# Patient Record
Sex: MALE | Race: WHITE
[De-identification: names, ages, dates, MRNs, and addresses within clinical notes are randomized per-mention and may not be internally consistent; named-entity substitution may affect disease eponyms.]

---

## 2018-02-22 ENCOUNTER — HOSPITAL ENCOUNTER (EMERGENCY)
Dept: HOSPITAL 76 - ED | Age: 68
Discharge: ANOTHER HOSPITAL | End: 2018-02-22
Payer: MEDICARE

## 2018-02-22 ENCOUNTER — HOSPITAL ENCOUNTER (OUTPATIENT)
Dept: HOSPITAL 76 - EMS | Age: 68
Discharge: ANOTHER HOSPITAL | End: 2018-02-22
Attending: SURGERY
Payer: MEDICARE

## 2018-02-22 VITALS — DIASTOLIC BLOOD PRESSURE: 56 MMHG | SYSTOLIC BLOOD PRESSURE: 90 MMHG

## 2018-02-22 DIAGNOSIS — I48.91: ICD-10-CM

## 2018-02-22 DIAGNOSIS — I71.01: Primary | ICD-10-CM

## 2018-02-22 DIAGNOSIS — R41.82: Primary | ICD-10-CM

## 2018-02-22 DIAGNOSIS — R53.1: ICD-10-CM

## 2018-02-22 DIAGNOSIS — I10: ICD-10-CM
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
## 2018-02-22 LAB
ANION GAP SERPL CALCULATED.4IONS-SCNC: 16 MMOL/L (ref 6–13)
BASOPHILS NFR BLD AUTO: 0.1 10^3/UL (ref 0–0.1)
BASOPHILS NFR BLD AUTO: 0.6 %
BUN SERPL-MCNC: 20 MG/DL (ref 6–20)
CALCIUM UR-MCNC: 8.9 MG/DL (ref 8.5–10.3)
CHLORIDE SERPL-SCNC: 101 MMOL/L (ref 101–111)
CO2 SERPL-SCNC: 21 MMOL/L (ref 21–32)
CREAT SERPLBLD-SCNC: 1.4 MG/DL (ref 0.6–1.2)
EOSINOPHIL # BLD AUTO: 0.2 10^3/UL (ref 0–0.7)
EOSINOPHIL NFR BLD AUTO: 2.2 %
ERYTHROCYTE [DISTWIDTH] IN BLOOD BY AUTOMATED COUNT: 13.9 % (ref 12–15)
GFRSERPLBLD MDRD-ARVRAT: 50 ML/MIN/{1.73_M2} (ref 89–?)
GLUCOSE SERPL-MCNC: 112 MG/DL (ref 70–100)
HGB UR QL STRIP: 13.9 G/DL (ref 14–18)
INR PPP: 1.4 (ref 0.8–1.2)
LYMPHOCYTES # SPEC AUTO: 3.7 10^3/UL (ref 1.5–3.5)
LYMPHOCYTES NFR BLD AUTO: 36.2 %
MCH RBC QN AUTO: 31.5 PG (ref 27–31)
MCHC RBC AUTO-ENTMCNC: 34.4 G/DL (ref 32–36)
MCV RBC AUTO: 91.8 FL (ref 80–94)
MONOCYTES # BLD AUTO: 0.8 10^3/UL (ref 0–1)
MONOCYTES NFR BLD AUTO: 8.2 %
NEUTROPHILS # BLD AUTO: 5.4 10^3/UL (ref 1.5–6.6)
NEUTROPHILS # SNV AUTO: 10.3 X10^3/UL (ref 4.8–10.8)
NEUTROPHILS NFR BLD AUTO: 52.8 %
PDW BLD AUTO: 9.7 FL (ref 7.4–11.4)
PLATELET # BLD: 156 10^3/UL (ref 130–450)
PROTHROM ACT/NOR PPP: 15.6 SECS (ref 9.9–12.6)
RBC MAR: 4.4 10^6/UL (ref 4.7–6.1)
SODIUM SERPLBLD-SCNC: 138 MMOL/L (ref 135–145)

## 2018-02-22 PROCEDURE — 84484 ASSAY OF TROPONIN QUANT: CPT

## 2018-02-22 PROCEDURE — 71275 CT ANGIOGRAPHY CHEST: CPT

## 2018-02-22 PROCEDURE — 36415 COLL VENOUS BLD VENIPUNCTURE: CPT

## 2018-02-22 PROCEDURE — 99285 EMERGENCY DEPT VISIT HI MDM: CPT

## 2018-02-22 PROCEDURE — 70498 CT ANGIOGRAPHY NECK: CPT

## 2018-02-22 PROCEDURE — 85025 COMPLETE CBC W/AUTO DIFF WBC: CPT

## 2018-02-22 PROCEDURE — 85610 PROTHROMBIN TIME: CPT

## 2018-02-22 PROCEDURE — 96360 HYDRATION IV INFUSION INIT: CPT

## 2018-02-22 PROCEDURE — 80048 BASIC METABOLIC PNL TOTAL CA: CPT

## 2018-02-22 PROCEDURE — 85730 THROMBOPLASTIN TIME PARTIAL: CPT

## 2018-02-22 PROCEDURE — 70496 CT ANGIOGRAPHY HEAD: CPT

## 2018-02-22 PROCEDURE — 93005 ELECTROCARDIOGRAM TRACING: CPT

## 2018-02-22 PROCEDURE — 74174 CTA ABD&PLVS W/CONTRAST: CPT

## 2018-02-22 PROCEDURE — 99284 EMERGENCY DEPT VISIT MOD MDM: CPT

## 2018-02-22 RX ADMIN — IOPAMIDOL ONE ML: 612 INJECTION, SOLUTION INTRAVENOUS at 14:59

## 2018-02-22 RX ADMIN — IOPAMIDOL ONE ML: 612 INJECTION, SOLUTION INTRAVENOUS at 15:03

## 2018-02-22 NOTE — ED PHYSICIAN DOCUMENTATION
History of Present Illness





- Stated complaint


Stated Complaint: Poss stroke





- Additonal information


Additional information: 





hx from pt wife and EMS


67 male


pmhx HTN maybe a fib


was fine this AM at 10AM


went to post office


returned in the truck and parked s difficulty at 1030


at approx 11 AM wife went looking for him and found him on his hand and knees 

in the snow with L sided weakness


he states he did not hit his head, 


initially stated he had a HA then repeatedly stated no HA


no neck pain


no chest pain


no abd pain








Review of Systems


Constitutional: denies: Fever


Cardiac: denies: Chest pain / pressure


Respiratory: denies: Dyspnea


GI: denies: Abdominal Pain, Vomiting


Musculoskeletal: denies: Neck pain


Neurologic: reports: Focal weakness, Numbness, Difficulty speaking, Headache.  

denies: Head injury


Endocrine: denies: Easy bruising / bleeding


Immunocompromised: denies: Immunocompromised





PD PAST MEDICAL HISTORY





- Past Surgical History


Past Surgical History: Yes


General: Appendectomy


HEENT: Tonsil/Adenoidectomy





- Allergies


Allergies/Adverse Reactions: 


 Allergies











Allergy/AdvReac Type Severity Reaction Status Date / Time


 


sulfacetamide sodium * Allergy Severe Unknown Verified 06/28/14 13:40





[From Sulfamide]     














- Social History


Does the pt smoke?: No


Smoking Status: Never smoker


Does the pt drink ETOH?: No


Does the pt have substance abuse?: No





- Immunizations


Immunizations are current?: No


Immunizations: TDAP >10years/unknown





- POLST


Patient has POLST: No





PD ED PE NORMAL





- Vitals


Vital signs reviewed: Yes





- General


General: Alert and oriented X 3





- HEENT


HEENT: Atraumatic, PERRL.  No: EOMI (gaze does not cross L of midline)





- Neck


Neck: No bony TTP





- Cardiac


Cardiac: RRR





- Respiratory


Respiratory: No respiratory distress, Clear bilaterally





- Abdomen


Abdomen: Soft, Non tender





- Derm


Derm: Other (diaphoretic)





- Neuro


Neuro: Alert and oriented X 3.  No: CNs 2-12 intact, No motor deficit, No 

sensory deficit, Normal speech


Eye Opening: Spontaneous


Motor: Obeys Commands


Verbal: Oriented


GCS Score: 15





Results





- Vitals


Vitals: 


 Vital Signs - 24 hr











  02/22/18 02/22/18 02/22/18





  12:01 13:29 13:55


 


Temperature 36.2 C L  


 


Heart Rate 88 44 L 56 L


 


Respiratory 18 16 16





Rate   


 


Blood Pressure 106/71 90/46 L 90/56 L


 


O2 Saturation 98 92 93








 Oxygen











O2 Source                      Room air

















- EKG (time done)


  ** 1237


Rate: Rate (enter#) (78)


Rhythm: Atrial fibrillation, Other (freq PVCs)


Axis: LAD


Intervals: No: Normal FL


Ischemia: Non specific changes





- Labs


Labs: 


 Laboratory Tests











  02/22/18 02/22/18 02/22/18





  12:05 12:05 12:05


 


WBC  10.3  


 


RBC  4.40 L  


 


Hgb  13.9 L  


 


Hct  40.4 L  


 


MCV  91.8  


 


MCH  31.5 H  


 


MCHC  34.4  


 


RDW  13.9  


 


Plt Count  156  


 


MPV  9.7  


 


Neut #  5.4  


 


Lymph #  3.7 H  


 


Mono #  0.8  


 


Eos #  0.2  


 


Baso #  0.1  


 


Absolute Nucleated RBC  0.00  


 


Nucleated RBC %  0.0  


 


PT   15.6 H 


 


INR   1.4 H 


 


APTT   27.3 


 


Sodium    138


 


Potassium    3.1 L


 


Chloride    101


 


Carbon Dioxide    21


 


Anion Gap    16.0 H


 


BUN    20


 


Creatinine    1.4 H


 


Estimated GFR (MDRD)    50 L


 


Glucose    112 H


 


Calcium    8.9


 


Troponin I   














  02/22/18





  12:05


 


WBC 


 


RBC 


 


Hgb 


 


Hct 


 


MCV 


 


MCH 


 


MCHC 


 


RDW 


 


Plt Count 


 


MPV 


 


Neut # 


 


Lymph # 


 


Mono # 


 


Eos # 


 


Baso # 


 


Absolute Nucleated RBC 


 


Nucleated RBC % 


 


PT 


 


INR 


 


APTT 


 


Sodium 


 


Potassium 


 


Chloride 


 


Carbon Dioxide 


 


Anion Gap 


 


BUN 


 


Creatinine 


 


Estimated GFR (MDRD) 


 


Glucose 


 


Calcium 


 


Troponin I  < 0.04














- Rads (name of study)


  ** CTH


Radiology: Other (per my read no ICH awaiting rad read - per rad no bleed, 

chronic small vessel dz, no acute infarct)





  ** CTA head


Radiology: See rad report (awaiting rad - per rad no occulusion R MCA, internal 

cervical arteries large thrombus filling defect R internal carotid, good 

collateral perfusion via ant communicating circ)





  ** CTA neck


Radiology: Other (call fro radiology Dr Villa = type A aortic dissection back 

to aortic valve but no pericardial effusin, active extravasation into false 

lumen)





  ** CTA chest abd pelvis


Radiology: Other (per my read type A dissection extending past kidney with 

decreased renal perfusion L kidney, per rad read aciute type A dissection, 

defect wall ascending aorta with blush contrast into false lumen is likely 

origin, no pericardial effusion but extends to aortic root and extends to L 

common iliac artery, kidney symm opacified, main arteries off true lumen)





PD MEDICAL DECISION MAKING





- ED course


ED course: 





acute L sided weakness numbness gaze palsy and dysarthria


FSBS 99 PTA


last seen nl at 10 AM but able to drive and park at 1030 so likely still nl 

then as well


initial concen was acute stroke


code stroke called - pt to radiology for CT head CTA head and neck


returned and neuro from [place] on tele stroke to eval pt and discuss possible 

TPA etc


but CTA neck showed dissection or aortic arch - type A - rad tech noted and 

called from CT and then we scanned the chest abd pelvis as well 


shows type A dissection from aortic root to iliacs, active leaking into false 

lumen form defect in ascending aorta


cancelled tele stroke


[place] [facility] [place] - transfer center confirmed 

bed and dispatched [facility] during the initial call - still a while for [facility] to be 

able to reach [place]


pt had gradually decreasing BPs despite IVF


pt and wife updated re critical nature of this process and that even at the , 

even with possible surgery etc he might die from this dissection


wife remained at bedside throughout pt stay and was allowed to fly with her 

 to 





- TPA CVA checklist


Inclusion crititeria: positive: Sig neuro deficit


Absolute contraindications: negative: SBP>185 DBP>110 s/p tx, INR >1.7, Known 

bleeding disorder, Surgery/trauma < 15 days, Seizure at onset, Internal bleed < 

22 days, Brain/spine surg < 3 m, Head trauma < 3 m, CVA < 3 months, Any hx ICH, 

Any hx brain aneurysm, Any hx brain AVM, Any hx brain tumor, Suspect SAH


Relative contraindications: negative: Too severe (NIHSS>22), Too mild, Rapid 

improvement, Glusose <50 >400, Life expectancy < 1 yr, Severe comorbid illness, 

Bacterial endocarditis, Severe hepatic dz, Severe renal dz, Hemorrhagic eye 

condition, Septic thrombophlebitis, Infected AV shunt, On coumadin, Pregnancy, 

Advanced age, Left heart thrombus


Absolute contraindications if 3-4.5 hr: negative: Coumadin (any INR), Age > 80, 

Combo prior CVA & DM





Departure





- Departure


Disposition: 02 Transfer Acute Care Hosp


Clinical Impression: 


Aortic dissection


Qualifiers:


 Aortic location: thoracic aorta Qualified Code(s): I71.01 - Dissection of 

thoracic aorta





Condition: Serious


Follow-Up: 


Lincoln Shin PA [Primary Care Provider] - 


Discharge Date/Time: 02/22/18 14:02





NIHSS





- Time


Time: 11:55





- Level of Consciousness


Level of consciousness: (0) Alert, Keenly responsive


LOC Questions: (0) Answers both Q's correct


LOC Commands: (0) Performs both correctly





- Gaze


Best Gaze: (1) Partial gaze palsy





- Visual


Visual: (0) No loss





- Facial Palsy


Facial Palsy: (1) Minor paralysis





- Motor Arms (both separate)


Motor Arm (right): (0) No drift


Motor Arm (left): (4) No movement





- Motor Legs (both separate)


Motor Leg (right): (0) No drift


Motor Leg (left): (4) No movement





- Limb Ataxia


Limb Ataxia: (0) Absent





- Sensory


Sensory: (1) Mild-to-moderate loss





- Best Language


Best Language: (0) No aphasia





- Dysarthria


Dysarthria: (1) Mild-to-moderate dysarthria





- Extinction and Inattention (formally neg


Extinction and inattention: (0) No abnormality





- Total Score/Results


Total Score/Result: 12

## 2018-02-22 NOTE — CT REPORT
EXAM:

CT ANGIOGRAM NECK

 

EXAM DATE: 2/22/2018 12:37 PM.

 

CLINICAL HISTORY: Acute stroke symptoms, left side weakness.

 

COMPARISON: None.

 

TECHNIQUE: Routine axial helical imaging was performed from the skull base through the aortic arch. R
econstructions: Routine multiplanar 3D MIP reconstructions. IV Contrast: 80 mL Isovue 300. Evaluation
 of arterial stenosis is based on a NASCET method of measurement.

 

In accordance with CT protocol optimization, one or more of the following dose reduction techniques w
ere utilized for this exam: automated exposure control, adjustment of mA and/or KV based on patient s
ize, or use of iterative reconstructive technique.

 

FINDINGS: 

Type A thoracic aortic dissection, please see chest CT angiogram for additional detail.

 

Moderate to severe origin stenosis of the right common carotid artery. Aortic dissection likely exten
ds distally into the right cervical carotid artery. Diffuse abnormal wall thickening or hypo-enhancem
ent of a portion of the distorted lumen creates the appearance of moderately prominent right common c
arotid artery stenosis distal to its origin. In the mid to distal right common carotid artery and ext
ending into much of the right cervical internal carotid artery is a long tubular intraluminal thrombu
s filling defect creating marked luminal stenosis. This is discontinuous in the high cervical ICA but
 appears to continue also in the horizontal petrous segment of the right ICA creating significant lum
inal contrast effacement. The right cavernous ICA appears to be maintained.

 

No evidence for acute abnormality, significant stenosis, or focal filling defect of the left cervical
 carotid artery. This vessel appears to arise from and communicate with the more strongly opacified p
resumably true lumen of the top of the aortic arch. No flow-limiting stenosis of the left subclavian 
artery origin. Stenosis of the innominate artery is present, the dissection likely extends into this 
artery branch which does appear to communicate with the true lumen at the top of the aortic arch. No 
flow limiting stenosis of the proximal right subclavian artery.

 

No evidence for acute abnormality or focal flow limiting stenosis of the right cervical vertebral art
juan. The lumen of the origin of the left cervical vertebral artery is obscured by densely calcified a
therosclerotic plaque and associated stenosis cannot be accurately evaluated. More distally, the left
 cervical vertebral artery shows no evidence for acute abnormality or flow-limiting stenosis.

 

Note that detailed arterial evaluation of the neck is limited, mixed venous and arterial contrast opa
cification with suboptimal concentration/density of arterial luminal opacification.

 

IMPRESSION: 

1. Probable Type A dissection of the thoracic aorta with large circumferential and expansile intramur
al hematoma.

2. Dissection appears to extend most notably into the right cervical carotid artery, but also appears
 to involve the proximal left subclavian artery and innominate artery.

3. Significant appearing stenosis of the right cervical carotid artery at multiple levels, this vesse
l contains a large long tubular intraluminal thrombus filling defect which places the patient at risk
 for intracranial embolus and infarct.

4. No evidence for focal flow limiting stenosis of the left cervical carotid artery.

5. Patent cervical vertebral arteries. The origin of the left cervical vertebral artery is obscured b
y a dense calcified plaque and potential stenosis in this location cannot be fully evaluated.

 

[facility]

Referring Provider Line: [phone]

 

SITE ID: [id number]

## 2018-02-22 NOTE — CT REPORT
EXAM:

CT ANGIOGRAM HEAD.

CT SCAN OF THE HEAD WITHOUT AND WITH CONTRAST.

 

EXAM DATE: 2/22/2018 12:37 PM

 

CLINICAL HISTORY: Acute left-sided weakness.

 

COMPARISON: None.

 

TECHNIQUE:

- CT Scan Head: Using a multidetector scanner, axial images were acquired from the foramen magnum to 
the skull vertex prior to and following contrast administration. 

- CT Angiogram: Using a multidetector scanner, high-resolution axial images were acquired from the sk
ull base through vertex following rapid infusion of intravenous contrast. Reformats: Multiplanar MIP 
reformats were reconstructed. Nascet criteria used for stenosis measurement.

 

IV Contrast: Without and with 140 mL Isovue-300.

 

In accordance with CT protocol optimization, one or more of the following dose reduction techniques w
ere utilized for this exam: automated exposure control, adjustment of mA and/or KV based on patient s
ize, or use of iterative reconstructive technique.

 

FINDINGS:  

Brain CT without contrast:

No CT evidence for acute ischemic infarct or acute intracranial hemorrhage.

 

Diffuse atrophy and moderately prominent amorphous white matter hypoattenuation, likely changes of ag
ing and chronic microangiopathy.

 

Intact calvarium. No acute sinus or mastoid disease.

 

Brain CT with contrast:

No intracranial enhancing tumor-like mass.

 

Head CT angiogram:

Cervical carotid artery abnormality is reported separately.

 

No proximal intracranial large artery occlusion. Both distal internal carotid arteries are patent at 
and distal to the cavernous segments. Mild vertebral artery luminal stenosis bilaterally where there 
is focal atherosclerotic calcification just above the foramen magnum.

 

No evidence for [place] aneurysm. Contrast opacification of the major dural venous sinuses i
s present as expected.

 

Patent anterior communicating artery and anterior cerebral artery A1 segments. No posterior communica
ting arteries identified.

 

IMPRESSION:

 

1. No CT evidence at this time for acute intracranial hemorrhage or acute ischemic cortical infarct.

2. No evidence for intracranial large vessel occlusion.

3. Right cervical ICA thrombus filling defect is present and reported separately in detail with the n
toni CTA. 

4. Diffuse atrophy and extensive amorphous cerebral hypodensity that may be from small vessel ischemi
c disease.

 

Findings discussed by telephone with the ordering provider Dr. Kassie Lubin 1:05 PM 02/22/2018.

 

[facility]

Referring Provider Line: [phone]

 

SITE ID: [id number]

## 2018-02-22 NOTE — CT PRELIMINARY REPORT
Accession: [id number]

Exam: CT NECK ANGIO

 

IMPRESSION: 

1. Probable Type A dissection of the thoracic aorta with large circumferential and expansile intramur
al hematoma.

2. Dissection appears to extend most notably into the right cervical carotid artery and also appears 
to involve the proximal left subclavian artery and innominate artery.

3. Significant appearing stenosis of the right cervical carotid artery at multiple levels, this vesse
l contains a large long tubular intraluminal thrombus filling defect which places the patient at risk
 for intracranial embolus and infarct.

4. No evidence for focal flow limiting stenosis of the left cervical carotid artery.

5. Patent cervical vertebral arteries. The origin of the left cervical vertebral artery is obscured b
y a dense calcified plaque and potential stenosis in this location cannot be fully evaluated.

 

[facility]

 

SITE ID: [id number]

## 2018-02-22 NOTE — CT PRELIMINARY REPORT
Accession: [id number]

Exam: CT HEAD ANGIO

 

Impression:

 

 

1. No CT evidence at this time for acute intracranial hemorrhage or acute ischemic cortical infarct.

2. No evidence for intracranial large vessel occlusion.

3. Right cervical ICA thrombus filling defect is present and reported separately in detail with the n
toni CTA. 

4. Diffuse atrophy and extensive amorphous cerebral hypodensity that may be from small vessel ischemi
c disease.

 

 

 

[facility]

 

SITE ID: [id number]

## 2018-02-22 NOTE — CT REPORT
EXAM:

CT ANGIOGRAM CHEST, ABDOMEN AND PELVIS 

 

EXAM DATE: 2/22/2018 12:46 PM.

 

CLINICAL HISTORY: Dissection seen on stroke imaging.

 

COMPARISONS: CTA neck earlier today.

 

TECHNIQUE: 

Thin section spiral CT angiography of the chest, abdomen and pelvis during IV contrast administration
. IV Contrast: 140 cc Isovue-300. Reconstructions: Coronal, sagittal, and 3D MIP reconstructions of t
he aorta.

 

In accordance with CT protocol optimization, one or more of the following dose reduction techniques w
ere utilized for this exam: automated exposure control, adjustment of mA and/or KV based on patient s
ize, or use of iterative reconstructive technique.

 

FINDINGS: 

Vascular Structures: Noncontrast images could not be obtained.

 

There is diffuse intramural hematoma of the thoracic and abdominal aorta generally circumferentially 
surrounding the patent aortic lumen, with extension into left common and proximal left external iliac
 arteries. 

 

A small bird beak intimal defect anterolaterally on the right in the ascending aorta with a collectio
n of heterogeneous hyperdense contrast in the otherwise nonenhancing intramural hematoma (66/4). The 
intramural hematoma is circumferential and extends down to the aortic root. Overall diameter of the a
scending aorta measures up to 7.1 cm in diameter, with the patent lumen at 2.8 cm in diameter (corona
l 50). Patent coronary ostia. Conventional arch configuration. There is circumferential hypodense mat
erial around the patent right innominate and proximal right common carotid arteries.

 

Transverse arch including circumferential intramural hematoma measures 4.1 cm and proximal descending
 aorta 4.5 cm in maximal diameter. There is a small collection of hyperdense contrast posteriorly in 
the intramural hematoma at the level of the thoracoabdominal junction. Extrinsic compression by hemat
sylvia on the lower descending thoracic aorta, with patent lumen measuring 2.2 x 1.2 cm and overall aort
ic diameter 3.2 cm (114).

 

The intramural hematoma compresses the patent lumen of the upper/mid abdominal aorta. At the level of
 the celiac axis overall aortic diameter is 4 cm, with patent lumen 2.3 x 1.4 cm (145). Patent celiac
 axis, SMA, RODOLFO, and bilateral renal arteries. A small accessory right renal artery supplies the righ
t midpole. Grossly nearly symmetrical bilateral renal parenchymal enhancement.

 

Overall aortic diameter at the level of the renal arteries 2.9 cm and above the aortic bifurcation 2.
6 cm in diameter. Left common iliac artery with eccentric intramural hematoma measures 1.8 cm in maxi
mal diameter and proximal left external iliac artery 1.4 cm.

 

No periaortic fluid or hematoma.

 

Bilateral pulmonary arteries are not well-opacified given arterial phase contrast bolus timing.

 

 

Lungs/Pleura: No consolidation or vascular congestion. Degenerative changes of the lower lungs. No pl
eural effusion or pneumothorax.

 

Mediastinum: Mild-to-moderate cardiomegaly without pericardial effusion. Small to borderline mediasti
nal nodes up to 1 cm in diameter in paratracheal and precarinal regions. Possible mildly enlarged rig
ht hilar node 1.4 cm short axis.

 

Abdominal Organs: Grossly unremarkable liver, spleen, pancreas, and adrenal glands. Normal renal size
 without hydronephrosis. A small parapelvic right renal cyst.

 

Peritoneal Cavity: Moderate hiatus hernia predominantly of the peritoneal fat. No intestinal dilatati
on or significant mesenteric edema. No free fluid or free air. Moderate stool distends the rectal vau
lt. No adenopathy by CT size criteria.

 

Retroperitoneum: No hematoma. Nonspecific small periaortic lymph nodes.

 

Pelvic Organs: Partially opacified suboptimally distended bladder without focal lesion identified. Mi
ld prostatic enlargement. No adenopathy evident.

 

Bones: Mild scoliosis and degenerative changes of the spine.

 

IMPRESSION: 

1. Extensive intramural hematoma/type A dissection of thoracic and abdominal aorta was extension into
 left common and proximal left external iliac arteries. A small intimal defect in the ascending aorta
 with localized collection of hyperdense contrast in the otherwise hypodense intramural hematoma. Sma
ll collection of contrast posteriorly in the intramural hematoma at the level of the thoracoabdominal
 junction. Overall ascending thoracic aortic diameter (including intramural hematoma) measures 7.1 cm
. Intramural hematoma extends to the right innominate and proximal right common carotid arteries.

2. The intramural hematoma is generally circumferential and extends down to the aortic root. No peric
ardial effusion. 

3. Major abdominal branch vessels are opacified from the true aortic lumen. Grossly symmetrical bilat
eral renal parenchymal enhancement, noting a small accessory right renal artery. 

4. Mild to moderate cardiomegaly. 

5. Nonspecific borderline/mild mediastinal and probable right hilar adenopathy. No intra-abdominal ad
enopathy. 

6. Hiatus hernia. 

7. No other acute process identified in the abdomen and pelvis.

 

Critical results: Findings discussed with the referring physician by phone at 1:12 PM.

 

[facility]

Referring Provider Line: [phone]

 

SITE ID: [id number]

## 2018-02-22 NOTE — CT PRELIMINARY REPORT
Accession: [id number]

Exam: CT CHEST ANGIO (AORTA)

 

IMPRESSION: 

1. Extensive intramural hematoma/type A dissection of thoracic and abdominal aorta was extension into
 left common and proximal left external iliac arteries. A small intimal defect in the ascending aorta
 with localized collection of hyperdense contrast in the otherwise hypodense intramural hematoma. Sma
ll collection of contrast posteriorly in the intramural hematoma at the level of the thoracoabdominal
 junction. Overall ascending thoracic aortic diameter (including intramural hematoma) measures 7.1 cm
. Intramural hematoma extends to the right innominate and proximal right common carotid arteries.

2. The intramural hematoma is generally circumferential and extends down to the aortic root. No peric
ardial effusion. 

3. Major abdominal branch vessels are opacified from the true aortic lumen. Grossly symmetrical bilat
eral renal parenchymal enhancement, noting a small accessory right renal artery. 

4. Mild to moderate cardiomegaly. 

5. Nonspecific borderline/mild mediastinal and probable right hilar adenopathy. No intra-abdominal ad
enopathy. 

6. Hiatus hernia. 

7. No other acute process identified in the abdomen and pelvis.

 

Critical results: Findings discussed with the referring physician by phone at 1:12 PM.

 

[facility]

 

SITE ID: [id number]